# Patient Record
Sex: FEMALE | Race: WHITE | URBAN - METROPOLITAN AREA
[De-identification: names, ages, dates, MRNs, and addresses within clinical notes are randomized per-mention and may not be internally consistent; named-entity substitution may affect disease eponyms.]

---

## 2022-01-03 ENCOUNTER — HOSPITAL ENCOUNTER (OUTPATIENT)
Age: 42
Discharge: HOME | End: 2022-01-03
Attending: OBSTETRICS & GYNECOLOGY | Admitting: OBSTETRICS & GYNECOLOGY
Payer: COMMERCIAL

## 2022-01-03 VITALS
WEIGHT: 140 LBS | HEIGHT: 66 IN | TEMPERATURE: 98.1 F | SYSTOLIC BLOOD PRESSURE: 102 MMHG | DIASTOLIC BLOOD PRESSURE: 58 MMHG | RESPIRATION RATE: 18 BRPM | BODY MASS INDEX: 22.5 KG/M2 | HEART RATE: 99 BPM

## 2022-01-03 LAB
BACTERIA: ABNORMAL /HPF
BILIRUBIN URINE: ABNORMAL
BLOOD, URINE: ABNORMAL
CLARITY: CLEAR
COLOR: YELLOW
EPITHELIAL CELLS, UA: ABNORMAL /HPF (ref 0–5)
GLUCOSE URINE: NEGATIVE MG/DL
KETONES, URINE: >=80 MG/DL
LEUKOCYTE ESTERASE, URINE: NEGATIVE
LIPASE: 11 U/L (ref 13–60)
MICROSCOPIC EXAMINATION: YES
MUCUS: ABNORMAL /LPF
NITRITE, URINE: NEGATIVE
PH UA: 6 (ref 5–8)
PROTEIN UA: ABNORMAL MG/DL
RBC UA: ABNORMAL /HPF (ref 0–4)
SPECIFIC GRAVITY UA: >=1.03 (ref 1–1.03)
URINE TYPE: ABNORMAL
UROBILINOGEN, URINE: 2 E.U./DL
WBC UA: ABNORMAL /HPF (ref 0–5)

## 2022-01-03 PROCEDURE — 99211 OFF/OP EST MAY X REQ PHY/QHP: CPT

## 2022-01-03 PROCEDURE — 36415 COLL VENOUS BLD VENIPUNCTURE: CPT

## 2022-01-03 PROCEDURE — 81001 URINALYSIS AUTO W/SCOPE: CPT

## 2022-01-03 PROCEDURE — 83690 ASSAY OF LIPASE: CPT

## 2022-01-03 NOTE — H&P
Department of Obstetrics and Gynecology  Labor and Delivery  Resident Triage Note      SUBJECTIVE:  42yo  at 37w presents with right sided flank pain for the last 3 weeks that has gradually worsened to the point that she went to urgent care last week. Normal delivery history and no current pregnancy complications with [facility] follow up. She wakes up due to pain every 15 minutes. Denies nausea, vomiting and abdominal pain. Mild chest pain and shortness of breath. Mild frontal headache bilaterally. Denies any urinary/bowel changes. Significant history includes a trip to [place] by car. Mild decrease in hydration after the flu 3 weeks ago, recent COVID test negative. OBJECTIVE    Vitals: There were no vitals taken for this visit.     CONSTITUTIONAL:  awake, alert, cooperative, no apparent distress, and appears stated age  LUNGS:  No increased work of breathing, good air exchange, clear to auscultation bilaterally, no crackles or wheezing  CARDIOVASCULAR:  Normal apical impulse, regular rate and rhythm, normal S1 and S2, no S3 or S4, and no murmur noted  ABDOMEN:  No scars, normal bowel sounds, soft, non-distended, non-tender, no masses palpated, no hepatosplenomegally  MUSCULOSKELETAL:  No lower extremity edema, calf size equal bilaterally     Cervix:             Dilation:  Closed     Fetal heart rate:         Baseline Heart Rate:  130       Accelerations:  present       Variability:  moderate    Contraction frequency: 0 minutes        DATA:  U/A:  No components found for: UCOLOR, UCLARITY, USPGRAV, UPH, UPROTEIN, UGLUCOSE, UKETONE, UBILI, UBLOOD, UNITRITE, UUROBIL, ULEUKEST, USQEPI, [employer], UWBC, Jolene, Synchari, [place]    Lab Results   Component Value Date    COLORU Yellow 2022    NITRU Negative 2022    GLUCOSEU Negative 2022    KETUA >=80 2022    UROBILINOGEN 2.0 2022    BILIRUBINUR SMALL 2022      Lab Results   Component Value Date    LIPASE 11.0 (L) 2022 ASSESSMENT & PLAN:      Right flank pain  - stable for discharge, reassuring fetal HR  - follow up with regular OB tomorrow   - encouraged oral hydration

## 2022-01-03 NOTE — PROGRESS NOTES
Pt verbalized understanding of verbal and written discharge instructions and denies having questions at this time. Pt left OB unit at 1610 ambulatory, undelivered, and in stable condition, accompanied byself. Patient is not in active labor.